# Patient Record
Sex: MALE
[De-identification: names, ages, dates, MRNs, and addresses within clinical notes are randomized per-mention and may not be internally consistent; named-entity substitution may affect disease eponyms.]

---

## 2020-02-28 PROBLEM — Z00.00 ENCOUNTER FOR PREVENTIVE HEALTH EXAMINATION: Status: ACTIVE | Noted: 2020-02-28

## 2020-03-06 ENCOUNTER — TRANSCRIPTION ENCOUNTER (OUTPATIENT)
Age: 30
End: 2020-03-06

## 2020-03-06 ENCOUNTER — APPOINTMENT (OUTPATIENT)
Dept: ORTHOPEDIC SURGERY | Facility: CLINIC | Age: 30
End: 2020-03-06
Payer: COMMERCIAL

## 2020-03-06 VITALS
WEIGHT: 149.91 LBS | DIASTOLIC BLOOD PRESSURE: 60 MMHG | BODY MASS INDEX: 23.53 KG/M2 | SYSTOLIC BLOOD PRESSURE: 110 MMHG | HEART RATE: 59 BPM | OXYGEN SATURATION: 97 % | HEIGHT: 67 IN

## 2020-03-06 DIAGNOSIS — Z78.9 OTHER SPECIFIED HEALTH STATUS: ICD-10-CM

## 2020-03-06 DIAGNOSIS — Z80.3 FAMILY HISTORY OF MALIGNANT NEOPLASM OF BREAST: ICD-10-CM

## 2020-03-06 DIAGNOSIS — R22.32 LOCALIZED SWELLING, MASS AND LUMP, LEFT UPPER LIMB: ICD-10-CM

## 2020-03-06 DIAGNOSIS — R22.40 LOCALIZED SWELLING, MASS AND LUMP, UNSPECIFIED LOWER LIMB: ICD-10-CM

## 2020-03-06 PROCEDURE — 76882 US LMTD JT/FCL EVL NVASC XTR: CPT

## 2020-03-06 PROCEDURE — 99204 OFFICE O/P NEW MOD 45 MIN: CPT | Mod: 25

## 2020-03-06 NOTE — PHYSICAL EXAM
[FreeTextEntry1] : On exam the patient stands in good balance.  He has no instability and is able to move around with no problems at all of his joints including left elbow and bilateral hips and knees.  He has minimal if any tenderness over the left elbow mass.  Both thigh masses have no pain.  There are no skin lesions above the.  He has no Tinel's over the masses and no thrill or bruit.  There are mobile small rubbery in the fairly superficial.  He has no lymphadenopathy in either inguinal or axillary areas.  He is neurovascularly intact. [General Appearance - Well-Appearing] : Well appearing [General Appearance - Well Nourished] : well nourished [Oriented To Time, Place, And Person] : Oriented to person, place, and time [Impaired Insight] : Insight and judgment were intact [Affect] : The affect was normal. [Mood] : the mood was normal [Sclera] : the sclera and conjunctiva were normal [Neck Cervical Mass (___cm)] : no neck mass was observed [Heart Rate And Rhythm] : heart rate was normal and rhythm regular [] : No respiratory distress [Abdomen Soft] : Soft [Normal Station and Gait] : gait and station were normal [Full UE ROM unless otherwise noted:] : Full range of motion unless otherwise noted. [Tenderness] : no tenderness [Masses] : masses [Skin Changes - Describe changes:] : No skin changes noted [Normal] : No palpable lymph nodes in the inguinal and popliteal beds [Full ROM Unless otherwise noted:] : Full range of motion unless otherwise noted:

## 2020-03-06 NOTE — DISCUSSION/SUMMARY
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  This point they are not changing.  My recommendation is to continue watching him.  If he finds anything getting bigger or symptomatic we can certainly take it out otherwise I will see him again in 6 months or a year for clinical follow-up.\par \par If imaging was ordered, the patient was told to make an appointment to review findings right after all imaging is completed.\par \par We discussed risks, benefits and alternatives. Rationale of care was reviewed and all questions were answered. Patient (and family) had all questions answered to her degree of the level of satisfaction. Patient (and family) expressed understanding and interest in proceeding with the plan as outlined.\par \par \par \par \par This note was done with a voice recognition transcription software and any typos are related to this rather than medical error. Surgical risks reviewed. Patient (and family) had all questions answered to an agreeable level of satisfaction. Patient (and family) expressed understanding and interest in proceeding with the plan as outlined.  \par

## 2020-03-06 NOTE — HISTORY OF PRESENT ILLNESS
[FreeTextEntry1] : This is a 30-year-old gentleman who comes in complaining of bilateral thigh small masses as well as a nondominant left elbow mass.  This is postero-medially in the elbow just near the ulnar nerve and somewhat proximal to the medial epicondyle.  He noticed it approximately 2-1/2 years ago living in [facility].  At that point he went to a doctor who told him it was most likely a lipoma.  He does not think it is gotten any bigger over time.  He also recently in the past few months noticed similar masses in his leg.  This is in his right thigh proximally anteriorly as well as posteriorly in the proximal left thigh.  He is both have not caused him any pain.  He has no other masses.\par His mother has a history of breast cancer. [Stable] : stable [0] : currently ~his/her~ pain is 0 out of 10 [Bending] : not exacerbated by bending [Direct Pressure] : not exacerbated by direct pressure [None] : No relieving factors are noted

## 2020-03-06 NOTE — DATA REVIEWED
[Imaging Present] : Present [de-identified] : Limited ultrasound today of the right proximal thigh mass shows a 1 x 1/2 cm mass in the subcutaneous tissue.\par \par Similarly limited ultrasound of the left posterior medial elbow shows the subcutaneous mass above the fascia similarly about 1 to 1-1/2 cm.  There is no fluid inside.  It does not purely fat.  This is not near the ulnar nerve.

## 2020-03-11 ENCOUNTER — APPOINTMENT (OUTPATIENT)
Dept: ORTHOPEDIC SURGERY | Facility: CLINIC | Age: 30
End: 2020-03-11